# Patient Record
Sex: FEMALE | Race: ASIAN | URBAN - METROPOLITAN AREA
[De-identification: names, ages, dates, MRNs, and addresses within clinical notes are randomized per-mention and may not be internally consistent; named-entity substitution may affect disease eponyms.]

---

## 2022-03-31 ENCOUNTER — OFFICE VISIT (OUTPATIENT)
Dept: URBAN - METROPOLITAN AREA CLINIC 82 | Facility: CLINIC | Age: 37
End: 2022-03-31
Payer: COMMERCIAL

## 2022-03-31 VITALS
HEART RATE: 86 BPM | DIASTOLIC BLOOD PRESSURE: 69 MMHG | TEMPERATURE: 97.8 F | WEIGHT: 118.6 LBS | SYSTOLIC BLOOD PRESSURE: 105 MMHG | BODY MASS INDEX: 21.83 KG/M2 | HEIGHT: 62 IN

## 2022-03-31 DIAGNOSIS — K80.21 CALCULUS OF GALLBLADDER WITH BILIARY OBSTRUCTION BUT WITHOUT CHOLECYSTITIS: ICD-10-CM

## 2022-03-31 DIAGNOSIS — K76.0 FATTY LIVER: ICD-10-CM

## 2022-03-31 DIAGNOSIS — R16.0 HEPATOMEGALY: ICD-10-CM

## 2022-03-31 DIAGNOSIS — R74.8 ABNORMAL LIVER ENZYMES: ICD-10-CM

## 2022-03-31 DIAGNOSIS — R18.8 OTHER ASCITES: ICD-10-CM

## 2022-03-31 PROBLEM — 80515008: Status: ACTIVE | Noted: 2022-03-31

## 2022-03-31 PROBLEM — 77528005: Status: ACTIVE | Noted: 2022-03-31

## 2022-03-31 PROBLEM — 166603001: Status: ACTIVE | Noted: 2022-03-31

## 2022-03-31 PROBLEM — 389026000: Status: ACTIVE | Noted: 2022-03-31

## 2022-03-31 PROBLEM — 197321007: Status: ACTIVE | Noted: 2022-03-31

## 2022-03-31 PROCEDURE — 99243 OFF/OP CNSLTJ NEW/EST LOW 30: CPT | Performed by: INTERNAL MEDICINE

## 2022-03-31 PROCEDURE — 99203 OFFICE O/P NEW LOW 30 MIN: CPT | Performed by: INTERNAL MEDICINE

## 2022-03-31 NOTE — HPI-TODAY'S VISIT:
[id number],   DOCTOR FOUND STONE IN LIVER, DR NEWTON  PATIENT DOES NOT KNOW WHY SHE IS HERE, ERCP??, SHE DOES KNOW WHY SHE NEED ERCP  S/P ALANNA, FEB,   SHE IS NOT HAVING PAIN, EATING NOT GOOD  NOT LOSSING,   BM GOOD, NO N/V, NO FEVER, NO BLEEDING, P/R  SHE HAS NO H/O LIVER PROBLEM, NO F/H OF LIVER PROBLEM. \

## 2022-04-02 LAB
A/G RATIO: 1.6
ALBUMIN: 4.6
ALKALINE PHOSPHATASE: 108
ALT (SGPT): 30
AST (SGOT): 32
BASO (ABSOLUTE): 0
BASOS: 1
BILIRUBIN, TOTAL: 0.4
BUN/CREATININE RATIO: 16
BUN: 8
CALCIUM: 9.6
CARBON DIOXIDE, TOTAL: 18
CHLORIDE: 104
CREATININE: 0.51
EGFR: 122
EOS (ABSOLUTE): 0.1
EOS: 2
GLOBULIN, TOTAL: 2.8
GLUCOSE: 123
HBSAG SCREEN: NEGATIVE
HEMATOCRIT: 33.5
HEMATOLOGY COMMENTS:: (no result)
HEMOGLOBIN: 11.4
HEP C VIRUS AB: <0.1
IMMATURE CELLS: (no result)
IMMATURE GRANS (ABS): 0
IMMATURE GRANULOCYTES: 0
LIPASE: 53
LYMPHS (ABSOLUTE): 1.5
LYMPHS: 27
MCH: 28.7
MCHC: 34
MCV: 84
MONOCYTES(ABSOLUTE): 0.4
MONOCYTES: 8
NEUTROPHILS (ABSOLUTE): 3.4
NEUTROPHILS: 62
NRBC: (no result)
PLATELETS: 284
POTASSIUM: 3.8
PROTEIN, TOTAL: 7.4
RBC: 3.97
RDW: 13.6
SODIUM: 137
WBC: 5.5

## 2022-04-18 ENCOUNTER — DASHBOARD ENCOUNTERS (OUTPATIENT)
Age: 37
End: 2022-04-18

## 2022-06-08 ENCOUNTER — OFFICE VISIT (OUTPATIENT)
Dept: URBAN - METROPOLITAN AREA CLINIC 82 | Facility: CLINIC | Age: 37
End: 2022-06-08